# Patient Record
Sex: MALE | Race: WHITE
[De-identification: names, ages, dates, MRNs, and addresses within clinical notes are randomized per-mention and may not be internally consistent; named-entity substitution may affect disease eponyms.]

---

## 2019-11-15 ENCOUNTER — HOSPITAL ENCOUNTER (OUTPATIENT)
Dept: HOSPITAL 92 - NM | Age: 79
Discharge: HOME | End: 2019-11-15
Attending: UROLOGY
Payer: MEDICARE

## 2019-11-15 DIAGNOSIS — C61: Primary | ICD-10-CM

## 2019-11-15 PROCEDURE — 78306 BONE IMAGING WHOLE BODY: CPT

## 2019-11-15 PROCEDURE — A9503 TC99M MEDRONATE: HCPCS

## 2019-11-15 NOTE — NM
NUCLEAR MEDICINE WHOLE BODY BONE SCAN:



HISTORY: 

Prostate cancer. Increasing PSA.



COMPARISON:

None.



TECHNIQUE: 

Patient was administered 33 mCi of technetium 99m MDP intravenously. After 3 hours, whole body imagin
g was performed.



FINDINGS: 

Degenerative changes in both shoulders.



Leftward curvature of the lumbar spine.



Visualized distribution of radiotracer. No scintigraphic evidence of osseous metastases.



IMPRESSION: 

No scintigraphic evidence of osseous metastasis.



Transcribed Date/Time: 11/15/2019 2:37 PM



Reported By: Ava Smith 

Electronically Signed:  11/15/2019 4:13 PM

## 2019-11-19 ENCOUNTER — HOSPITAL ENCOUNTER (OUTPATIENT)
Dept: HOSPITAL 92 - TBSIIMAG | Age: 79
Discharge: HOME | End: 2019-11-19
Attending: UROLOGY
Payer: MEDICARE

## 2019-11-19 DIAGNOSIS — C61: Primary | ICD-10-CM

## 2019-11-19 LAB — ESTIMATED GFR-MDRD - POC: 45

## 2019-11-19 PROCEDURE — 72197 MRI PELVIS W/O & W/DYE: CPT

## 2019-11-19 PROCEDURE — A9579 GAD-BASE MR CONTRAST NOS,1ML: HCPCS

## 2019-11-19 PROCEDURE — 82565 ASSAY OF CREATININE: CPT

## 2019-11-20 NOTE — MRI
MRI PELVIS WITH AND WITHOUT CONTRAST:

PROSTATE PROTOCOL

 

DATE:  11/19/19 

 

HISTORY:  

Prostate cancer. 

 

COMPARISON:  

None. 

 

FINDINGS:

MRI of the pelvis was performed prior to and after the intravenous administration of contrast per pro
state protocol. The exam was reviewed on an independent 3D workstation. 

 

The prostate measures 4.4 x 2.4 x 2.7 cm for a volume of 12.96 mL. 

 

Peripheral zone:  Evaluation of peripheral zone is severely limited on the diffusion-weighted imaging
 sequence which are nondiagnostic due to amount of gas within the rectum. PI-RADS evaluation is limit
ed. There is no definite focal area of diffusion restricted appreciated. 

 

Transitional zone:  Also limited due to the extensive gas in the rectum. No definite lentiform uncirc
umscribed homogeneous area of hypointense signal. 

 

Prostatic capsule:  Intact. 

 

Neurovascular bundles:  Intact. 

 

Seminal vesicles:  No seminal vesicles are appreciated. 

 

Urinary bladder:  Unremarkable. 

 

Lymph nodes:  No adenopathy. 

 

Bones:  On T1-weighted imaging sequence, there are no focal areas of signal replacement to suggest os
seous metastatic disease. 

 

IMPRESSION: 

1.  Severely limited exam due to the amount of gas within the rectum. 

PI-RADS Category 2:  Low. Likely significant cancer is unlikely to be present. 

 

2.  Small low signal prostate likely sequelae of chronic inflammation/prostatitis. 

 

3.  No evidence for local regional or osseous metastatic disease. 

 

POS: [facility]